# Patient Record
Sex: FEMALE
[De-identification: names, ages, dates, MRNs, and addresses within clinical notes are randomized per-mention and may not be internally consistent; named-entity substitution may affect disease eponyms.]

---

## 2019-04-01 ENCOUNTER — TRANSCRIPTION ENCOUNTER (OUTPATIENT)
Age: 19
End: 2019-04-01

## 2020-12-26 ENCOUNTER — TRANSCRIPTION ENCOUNTER (OUTPATIENT)
Age: 20
End: 2020-12-26

## 2021-01-02 ENCOUNTER — EMERGENCY (EMERGENCY)
Facility: HOSPITAL | Age: 21
LOS: 1 days | Discharge: HOME | End: 2021-01-02
Attending: EMERGENCY MEDICINE | Admitting: EMERGENCY MEDICINE
Payer: COMMERCIAL

## 2021-01-02 VITALS — HEART RATE: 98 BPM

## 2021-01-02 VITALS
DIASTOLIC BLOOD PRESSURE: 70 MMHG | HEART RATE: 118 BPM | TEMPERATURE: 99 F | OXYGEN SATURATION: 99 % | SYSTOLIC BLOOD PRESSURE: 145 MMHG | RESPIRATION RATE: 20 BRPM

## 2021-01-02 LAB
ALBUMIN SERPL ELPH-MCNC: 4.6 G/DL — SIGNIFICANT CHANGE UP (ref 3.3–5)
ALP SERPL-CCNC: 60 U/L — SIGNIFICANT CHANGE UP (ref 40–120)
ALT FLD-CCNC: 10 U/L — SIGNIFICANT CHANGE UP (ref 4–33)
ANION GAP SERPL CALC-SCNC: 15 MMOL/L — HIGH (ref 7–14)
APPEARANCE UR: ABNORMAL
APTT BLD: 34.6 SEC — SIGNIFICANT CHANGE UP (ref 27–36.3)
AST SERPL-CCNC: 18 U/L — SIGNIFICANT CHANGE UP (ref 4–32)
BASOPHILS # BLD AUTO: 0.03 K/UL — SIGNIFICANT CHANGE UP (ref 0–0.2)
BASOPHILS NFR BLD AUTO: 0.4 % — SIGNIFICANT CHANGE UP (ref 0–2)
BILIRUB SERPL-MCNC: 0.4 MG/DL — SIGNIFICANT CHANGE UP (ref 0.2–1.2)
BILIRUB UR-MCNC: NEGATIVE — SIGNIFICANT CHANGE UP
BLOOD GAS VENOUS COMPREHENSIVE RESULT: SIGNIFICANT CHANGE UP
BUN SERPL-MCNC: 8 MG/DL — SIGNIFICANT CHANGE UP (ref 7–23)
CALCIUM SERPL-MCNC: 9.6 MG/DL — SIGNIFICANT CHANGE UP (ref 8.4–10.5)
CHLORIDE SERPL-SCNC: 101 MMOL/L — SIGNIFICANT CHANGE UP (ref 98–107)
CO2 SERPL-SCNC: 22 MMOL/L — SIGNIFICANT CHANGE UP (ref 22–31)
COLOR SPEC: YELLOW — SIGNIFICANT CHANGE UP
CREAT SERPL-MCNC: 0.76 MG/DL — SIGNIFICANT CHANGE UP (ref 0.5–1.3)
DIFF PNL FLD: NEGATIVE — SIGNIFICANT CHANGE UP
EOSINOPHIL # BLD AUTO: 0.05 K/UL — SIGNIFICANT CHANGE UP (ref 0–0.5)
EOSINOPHIL NFR BLD AUTO: 0.7 % — SIGNIFICANT CHANGE UP (ref 0–6)
GLUCOSE SERPL-MCNC: 87 MG/DL — SIGNIFICANT CHANGE UP (ref 70–99)
GLUCOSE UR QL: NEGATIVE — SIGNIFICANT CHANGE UP
HCT VFR BLD CALC: 46 % — HIGH (ref 34.5–45)
HGB BLD-MCNC: 14.7 G/DL — SIGNIFICANT CHANGE UP (ref 11.5–15.5)
IANC: 4.76 K/UL — SIGNIFICANT CHANGE UP (ref 1.5–8.5)
IMM GRANULOCYTES NFR BLD AUTO: 0.3 % — SIGNIFICANT CHANGE UP (ref 0–1.5)
INR BLD: 1.22 RATIO — HIGH (ref 0.88–1.16)
KETONES UR-MCNC: ABNORMAL
LEUKOCYTE ESTERASE UR-ACNC: NEGATIVE — SIGNIFICANT CHANGE UP
LYMPHOCYTES # BLD AUTO: 1.99 K/UL — SIGNIFICANT CHANGE UP (ref 1–3.3)
LYMPHOCYTES # BLD AUTO: 26.7 % — SIGNIFICANT CHANGE UP (ref 13–44)
MCHC RBC-ENTMCNC: 31 PG — SIGNIFICANT CHANGE UP (ref 27–34)
MCHC RBC-ENTMCNC: 32 GM/DL — SIGNIFICANT CHANGE UP (ref 32–36)
MCV RBC AUTO: 97 FL — SIGNIFICANT CHANGE UP (ref 80–100)
MONOCYTES # BLD AUTO: 0.6 K/UL — SIGNIFICANT CHANGE UP (ref 0–0.9)
MONOCYTES NFR BLD AUTO: 8.1 % — SIGNIFICANT CHANGE UP (ref 2–14)
NEUTROPHILS # BLD AUTO: 4.76 K/UL — SIGNIFICANT CHANGE UP (ref 1.8–7.4)
NEUTROPHILS NFR BLD AUTO: 63.8 % — SIGNIFICANT CHANGE UP (ref 43–77)
NITRITE UR-MCNC: NEGATIVE — SIGNIFICANT CHANGE UP
NRBC # BLD: 0 /100 WBCS — SIGNIFICANT CHANGE UP
NRBC # FLD: 0 K/UL — SIGNIFICANT CHANGE UP
PH UR: 6 — SIGNIFICANT CHANGE UP (ref 5–8)
PLATELET # BLD AUTO: 335 K/UL — SIGNIFICANT CHANGE UP (ref 150–400)
POTASSIUM SERPL-MCNC: 4.1 MMOL/L — SIGNIFICANT CHANGE UP (ref 3.5–5.3)
POTASSIUM SERPL-SCNC: 4.1 MMOL/L — SIGNIFICANT CHANGE UP (ref 3.5–5.3)
PROT SERPL-MCNC: 7.9 G/DL — SIGNIFICANT CHANGE UP (ref 6–8.3)
PROT UR-MCNC: ABNORMAL
PROTHROM AB SERPL-ACNC: 13.9 SEC — HIGH (ref 10.6–13.6)
RBC # BLD: 4.74 M/UL — SIGNIFICANT CHANGE UP (ref 3.8–5.2)
RBC # FLD: 11.9 % — SIGNIFICANT CHANGE UP (ref 10.3–14.5)
SARS-COV-2 RNA SPEC QL NAA+PROBE: SIGNIFICANT CHANGE UP
SODIUM SERPL-SCNC: 138 MMOL/L — SIGNIFICANT CHANGE UP (ref 135–145)
SP GR SPEC: 1.03 — HIGH (ref 1.01–1.02)
UROBILINOGEN FLD QL: ABNORMAL
WBC # BLD: 7.45 K/UL — SIGNIFICANT CHANGE UP (ref 3.8–10.5)
WBC # FLD AUTO: 7.45 K/UL — SIGNIFICANT CHANGE UP (ref 3.8–10.5)

## 2021-01-02 PROCEDURE — 99283 EMERGENCY DEPT VISIT LOW MDM: CPT

## 2021-01-02 RX ORDER — SODIUM CHLORIDE 9 MG/ML
1000 INJECTION INTRAMUSCULAR; INTRAVENOUS; SUBCUTANEOUS ONCE
Refills: 0 | Status: COMPLETED | OUTPATIENT
Start: 2021-01-02 | End: 2021-01-02

## 2021-01-02 RX ADMIN — SODIUM CHLORIDE 1000 MILLILITER(S): 9 INJECTION INTRAMUSCULAR; INTRAVENOUS; SUBCUTANEOUS at 10:54

## 2021-01-02 NOTE — ED PROVIDER NOTE - PATIENT PORTAL LINK FT
You can access the FollowMyHealth Patient Portal offered by [facility] by registering at the following website: [email or web address]. By joining [employer]’s FollowMyHealth portal, you will also be able to view your health information using other applications (apps) compatible with our system.

## 2021-01-02 NOTE — ED PROVIDER NOTE - ATTENDING CONTRIBUTION TO CARE
Dr. Chauhan:  I have personally performed a face to face bedside history and physical examination of this patient. I have discussed the history, examination, review of systems, assessment and plan of management with the resident. I have reviewed the electronic medical record and amended it to reflect my history, review of systems, physical exam, assessment and plan.    20F denies pmh presents with 3-4 days of diarrhea with streaks of blood mixed in, approximately 4 episodes a day.  Intermittently with mild abdominal cramping.  Denies sick contacts, fever/chills, cp ,sob, n/v, urinary symptoms.  Denies family h/o IBD.    Exam:  - nad  - rrr   -ctab   -abd soft ntnd    A/P  - blood streaked stools, likely infectious component; consider IBD, less likely malignancy given age  - cbc, cmp, coags, ucg  - f/u GI outpatient Dr. Chauhan:  I have personally performed a face to face bedside history and physical examination of this patient. I have discussed the history, examination, review of systems, assessment and plan of management with the resident. I have reviewed the electronic medical record and amended it to reflect my history, review of systems, physical exam, assessment and plan.    20F denies pmh presents with 3-4 days of diarrhea with streaks of blood mixed in, approximately 4 episodes a day.  Intermittently with mild abdominal cramping.  Denies sick contacts, fever/chills, cp ,sob, n/v, urinary symptoms.  Denies family h/o IBD.    Exam:  - nad  - rrr   -ctab   -abd soft ntnd    A/P  - blood streaked stools, likely infectious component; consider IBD, less likely malignancy given age  - cbc, cmp, coags, ucg, covid swab  - f/u GI outpatient I performed the initial face to face bedside interview with this patient regarding history of present illness, review of symptoms and past medical, social and family history.  I completed an independent physical examination.  I was the initial provider who evaluated this patient.  The history, review of symptoms and examination was documented by me.  I have signed out the follow up of any pending tests (i.e. labs, radiological studies) to the resident.  I have discussed the patient’s plan of care and disposition with the resident.

## 2021-01-02 NOTE — ED PROVIDER NOTE - PROGRESS NOTE DETAILS
Jose Durham PGY1: Pt was re-evaluated at bedside, VSS, feeling better overall. Results were discussed with patient as well as return precautions and follow up plan with PCP and/or specialist. Time was taken to answer any questions that the patient had before providing them with discharge paperwork.

## 2021-01-02 NOTE — ED PROVIDER NOTE - NSFOLLOWUPCLINICS_GEN_ALL_ED_FT
[facility]  Gastroenterology  [address] [place] [id number]  Phone: [phone]  Fax:   Follow Up Time:

## 2021-01-02 NOTE — ED ADULT TRIAGE NOTE - CHIEF COMPLAINT QUOTE
Pt c/o diarrhea with abdominal cramping and blood in her stool x4 days--Pt states she sees blood when she goes

## 2021-01-02 NOTE — ED PROVIDER NOTE - NSFOLLOWUPINSTRUCTIONS_ED_ALL_ED_FT
Diarrhea    Diarrhea is frequent loose or watery bowel movements that has many causes. Diarrhea can make you feel weak and cause you to become dehydrated. Diarrhea typically lasts 2–3 days, but can last longer if it is a sign of something more serious. Drink clear fluids to prevent dehydration. Eat bland, easy-to-digest foods as tolerated.     SEEK IMMEDIATE MEDICAL CARE IF YOU HAVE ANY OF THE FOLLOWING SYMPTOMS: high fevers, lightheadedness/dizziness, chest pain, shortness of breath, severe abdominal or back pain, or any signs of dehydration.    Please make an appointment to follow up with a Gastroenterologist within 2-3 days.

## 2021-01-02 NOTE — ED ADULT NURSE NOTE - NSIMPLEMENTINTERV_GEN_ALL_ED
Implemented All Universal Safety Interventions:  [place] to call system. Call bell, personal items and telephone within reach. Instruct patient to call for assistance. Room bathroom lighting operational. Non-slip footwear when patient is off stretcher. Physically safe environment: no spills, clutter or unnecessary equipment. Stretcher in lowest position, wheels locked, appropriate side rails in place.

## 2021-01-02 NOTE — ED PROVIDER NOTE - CLINICAL SUMMARY MEDICAL DECISION MAKING FREE TEXT BOX
- blood streaked stools, likely infectious component; consider IBD, less likely malignancy given age  - cbc, cmp, coags, ucg, covid swab  - f/u GI outpatient - blood streaked stools, likely infectious component; consider IBD, less likely malignancy given age  - cbc, cmp, coags, ucg, covid swab  - f/u GI outpatient, given referral list

## 2021-01-02 NOTE — ED PROVIDER NOTE - OBJECTIVE STATEMENT
20F denies pmh presents with 3-4 days of diarrhea with streaks of blood mixed in, approximately 4 episodes a day.  Intermittently with mild abdominal cramping.  Denies sick contacts, fever/chills, cp ,sob, n/v, urinary symptoms.  Denies family h/o IBD.

## 2021-01-04 ENCOUNTER — APPOINTMENT (OUTPATIENT)
Dept: GASTROENTEROLOGY | Facility: CLINIC | Age: 21
End: 2021-01-04
Payer: COMMERCIAL

## 2021-01-04 ENCOUNTER — NON-APPOINTMENT (OUTPATIENT)
Age: 21
End: 2021-01-04

## 2021-01-04 VITALS
HEART RATE: 116 BPM | TEMPERATURE: 96.9 F | BODY MASS INDEX: 19.81 KG/M2 | WEIGHT: 116 LBS | SYSTOLIC BLOOD PRESSURE: 127 MMHG | HEIGHT: 64 IN | DIASTOLIC BLOOD PRESSURE: 90 MMHG

## 2021-01-04 DIAGNOSIS — Z80.3 FAMILY HISTORY OF MALIGNANT NEOPLASM OF BREAST: ICD-10-CM

## 2021-01-04 DIAGNOSIS — Z80.0 FAMILY HISTORY OF MALIGNANT NEOPLASM OF DIGESTIVE ORGANS: ICD-10-CM

## 2021-01-04 DIAGNOSIS — R19.5 OTHER FECAL ABNORMALITIES: ICD-10-CM

## 2021-01-04 PROBLEM — Z00.00 ENCOUNTER FOR PREVENTIVE HEALTH EXAMINATION: Status: ACTIVE | Noted: 2021-01-04

## 2021-01-04 LAB
CULTURE RESULTS: SIGNIFICANT CHANGE UP
SPECIMEN SOURCE: SIGNIFICANT CHANGE UP

## 2021-01-04 PROCEDURE — 82272 OCCULT BLD FECES 1-3 TESTS: CPT

## 2021-01-04 PROCEDURE — 99204 OFFICE O/P NEW MOD 45 MIN: CPT

## 2021-01-04 PROCEDURE — 99072 ADDL SUPL MATRL&STAF TM PHE: CPT

## 2021-01-04 NOTE — HISTORY OF PRESENT ILLNESS
[FreeTextEntry1] : Sara was in her usual state of good health until approximately 4-5 days ago, when she noted diffuse abdominal cramps and bloody diarrhea.  She also noted sense of incomplete evacuation with borborygmi, burping, and flatulence.  Symptoms have been awakening her from a sound sleep.  She took Pepto-Bismol, without improvement.  Because of the tenesmus, she tried a laxative for two days, without improvement.  She denied fever, nausea, ingestion of bad food, others similarly affected, recent antibiotic use, or recent travel.  She presented to [facility] on 1/2/21, where labs were unremarkable, and she was discharged, to be seen by GI.  Her grandmother had liver cancer.

## 2021-01-04 NOTE — ASSESSMENT
[FreeTextEntry1] : 1.  Recent crampy pain with bloody diarrhea, tenesmus, gassiness--rule out acute, self-limited colitis (such as infection) or initial presentation of inflammatory bowel disease.\par 2.  Allergic to amoxicillin, peanuts.\par \par Plan:\par 1.  Records from [facility] ER reviewed.\par 2.  Submit stool specimen to lab for GI PCR.\par 3.  Dietary instruction given.\par 4.  If stool negative for pathogen, proceed with diagnostic colonoscopy--Procedure, rationale, anesthesia plan, and MiraLax prep instructions were reviewed and brochure given.\par 5.  Might also consider cross-sectional imaging if stools negative.\par 6.  Low-dose Imodium after stool submitted to the lab.

## 2021-01-04 NOTE — PHYSICAL EXAM
[General Appearance - Alert] : alert [General Appearance - In No Acute Distress] : in no acute distress [General Appearance - Well Nourished] : well nourished [General Appearance - Well Developed] : well developed [Sclera] : the sclera and conjunctiva were normal [Neck Appearance] : the appearance of the neck was normal [Neck Cervical Mass (___cm)] : no neck mass was observed [Jugular Venous Distention Increased] : there was no jugular-venous distention [Thyroid Diffuse Enlargement] : the thyroid was not enlarged [Thyroid Nodule] : there were no palpable thyroid nodules [Auscultation Breath Sounds / Voice Sounds] : lungs were clear to auscultation bilaterally [Heart Rate And Rhythm] : heart rate was normal and rhythm regular [Heart Sounds] : normal S1 and S2 [Heart Sounds Gallop] : no gallops [Murmurs] : no murmurs [Heart Sounds Pericardial Friction Rub] : no pericardial rub [Full Pulse] : the pedal pulses are present [Edema] : there was no peripheral edema [Abdomen Soft] : soft [Abdomen Tenderness] : non-tender [Abdomen Mass (___ Cm)] : no abdominal mass palpated [Abdomen Hernia] : no hernia was discovered [Hyperactive] : hyperactive [Normal Sphincter Tone] : normal sphincter tone [No Rectal Mass] : no rectal mass [Occult Blood Positive] : stool positive for occult blood [Cervical Lymph Nodes Enlarged Posterior Bilaterally] : posterior cervical [Cervical Lymph Nodes Enlarged Anterior Bilaterally] : anterior cervical [Supraclavicular Lymph Nodes Enlarged Bilaterally] : supraclavicular [Inguinal Lymph Nodes Enlarged Bilaterally] : inguinal [Abnormal Walk] : normal gait [Nail Clubbing] : no clubbing  or cyanosis of the fingernails [Musculoskeletal - Swelling] : no joint swelling seen [Motor Tone] : muscle strength and tone were normal [Skin Color & Pigmentation] : normal skin color and pigmentation [Skin Turgor] : normal skin turgor [] : no rash [Oriented To Time, Place, And Person] : oriented to person, place, and time [Impaired Insight] : insight and judgment were intact [Internal Hemorrhoid] : no internal hemorrhoids [External Hemorrhoid] : no external hemorrhoids [FreeTextEntry1] : a bit tearful

## 2021-01-04 NOTE — REVIEW OF SYSTEMS
[Abdominal Pain] : abdominal pain [Diarrhea] : diarrhea [Melena] : melbay [Negative] : Heme/Lymph [As Noted in HPI] : as noted in HPI

## 2021-01-05 ENCOUNTER — LABORATORY RESULT (OUTPATIENT)
Age: 21
End: 2021-01-05

## 2021-01-06 ENCOUNTER — EMERGENCY (EMERGENCY)
Facility: HOSPITAL | Age: 21
LOS: 1 days | Discharge: HOME | End: 2021-01-06
Attending: EMERGENCY MEDICINE
Payer: COMMERCIAL

## 2021-01-06 VITALS
DIASTOLIC BLOOD PRESSURE: 90 MMHG | HEART RATE: 99 BPM | SYSTOLIC BLOOD PRESSURE: 126 MMHG | TEMPERATURE: 98 F | RESPIRATION RATE: 17 BRPM | OXYGEN SATURATION: 99 %

## 2021-01-06 VITALS
HEIGHT: 63 IN | RESPIRATION RATE: 18 BRPM | OXYGEN SATURATION: 100 % | WEIGHT: 119.93 LBS | HEART RATE: 119 BPM | TEMPERATURE: 98 F | SYSTOLIC BLOOD PRESSURE: 134 MMHG | DIASTOLIC BLOOD PRESSURE: 97 MMHG

## 2021-01-06 LAB
ALBUMIN SERPL ELPH-MCNC: 4.5 G/DL — SIGNIFICANT CHANGE UP (ref 3.3–5)
ALP SERPL-CCNC: 60 U/L — SIGNIFICANT CHANGE UP (ref 40–120)
ALT FLD-CCNC: 7 U/L — LOW (ref 10–45)
ANION GAP SERPL CALC-SCNC: 15 MMOL/L — SIGNIFICANT CHANGE UP (ref 5–17)
AST SERPL-CCNC: 16 U/L — SIGNIFICANT CHANGE UP (ref 10–40)
BASE EXCESS BLDV CALC-SCNC: -1.5 MMOL/L — SIGNIFICANT CHANGE UP (ref -2–2)
BASOPHILS # BLD AUTO: 0.02 K/UL — SIGNIFICANT CHANGE UP (ref 0–0.2)
BASOPHILS NFR BLD AUTO: 0.2 % — SIGNIFICANT CHANGE UP (ref 0–2)
BILIRUB SERPL-MCNC: 0.2 MG/DL — SIGNIFICANT CHANGE UP (ref 0.2–1.2)
BUN SERPL-MCNC: 5 MG/DL — LOW (ref 7–23)
CA-I SERPL-SCNC: 1.12 MMOL/L — SIGNIFICANT CHANGE UP (ref 1.12–1.3)
CALCIUM SERPL-MCNC: 9 MG/DL — SIGNIFICANT CHANGE UP (ref 8.4–10.5)
CHLORIDE BLDV-SCNC: 107 MMOL/L — SIGNIFICANT CHANGE UP (ref 96–108)
CHLORIDE SERPL-SCNC: 100 MMOL/L — SIGNIFICANT CHANGE UP (ref 96–108)
CO2 BLDV-SCNC: 27 MMOL/L — SIGNIFICANT CHANGE UP (ref 22–30)
CO2 SERPL-SCNC: 22 MMOL/L — SIGNIFICANT CHANGE UP (ref 22–31)
CREAT SERPL-MCNC: 0.73 MG/DL — SIGNIFICANT CHANGE UP (ref 0.5–1.3)
EOSINOPHIL # BLD AUTO: 0.07 K/UL — SIGNIFICANT CHANGE UP (ref 0–0.5)
EOSINOPHIL NFR BLD AUTO: 0.8 % — SIGNIFICANT CHANGE UP (ref 0–6)
GAS PNL BLDV: 129 MMOL/L — LOW (ref 135–145)
GAS PNL BLDV: SIGNIFICANT CHANGE UP
GAS PNL BLDV: SIGNIFICANT CHANGE UP
GLUCOSE BLDV-MCNC: 89 MG/DL — SIGNIFICANT CHANGE UP (ref 70–99)
GLUCOSE SERPL-MCNC: 89 MG/DL — SIGNIFICANT CHANGE UP (ref 70–99)
HCO3 BLDV-SCNC: 25 MMOL/L — SIGNIFICANT CHANGE UP (ref 21–29)
HCT VFR BLD CALC: 40.5 % — SIGNIFICANT CHANGE UP (ref 34.5–45)
HCT VFR BLDA CALC: 46 % — SIGNIFICANT CHANGE UP (ref 39–50)
HGB BLD CALC-MCNC: 15 G/DL — SIGNIFICANT CHANGE UP (ref 11.5–15.5)
HGB BLD-MCNC: 13.5 G/DL — SIGNIFICANT CHANGE UP (ref 11.5–15.5)
IMM GRANULOCYTES NFR BLD AUTO: 0.3 % — SIGNIFICANT CHANGE UP (ref 0–1.5)
LACTATE BLDV-MCNC: 1.6 MMOL/L — SIGNIFICANT CHANGE UP (ref 0.7–2)
LACTATE BLDV-MCNC: 3.1 MMOL/L — HIGH (ref 0.7–2)
LIDOCAIN IGE QN: 18 U/L — SIGNIFICANT CHANGE UP (ref 7–60)
LYMPHOCYTES # BLD AUTO: 4.39 K/UL — HIGH (ref 1–3.3)
LYMPHOCYTES # BLD AUTO: 48.5 % — HIGH (ref 13–44)
MAGNESIUM SERPL-MCNC: 1.9 MG/DL — SIGNIFICANT CHANGE UP (ref 1.6–2.6)
MCHC RBC-ENTMCNC: 31.9 PG — SIGNIFICANT CHANGE UP (ref 27–34)
MCHC RBC-ENTMCNC: 33.3 GM/DL — SIGNIFICANT CHANGE UP (ref 32–36)
MCV RBC AUTO: 95.7 FL — SIGNIFICANT CHANGE UP (ref 80–100)
MONOCYTES # BLD AUTO: 1 K/UL — HIGH (ref 0–0.9)
MONOCYTES NFR BLD AUTO: 11 % — SIGNIFICANT CHANGE UP (ref 2–14)
NEUTROPHILS # BLD AUTO: 3.54 K/UL — SIGNIFICANT CHANGE UP (ref 1.8–7.4)
NEUTROPHILS NFR BLD AUTO: 39.2 % — LOW (ref 43–77)
NRBC # BLD: 0 /100 WBCS — SIGNIFICANT CHANGE UP (ref 0–0)
PCO2 BLDV: 54 MMHG — HIGH (ref 35–50)
PH BLDV: 7.3 — LOW (ref 7.35–7.45)
PLATELET # BLD AUTO: 333 K/UL — SIGNIFICANT CHANGE UP (ref 150–400)
PO2 BLDV: 25 MMHG — SIGNIFICANT CHANGE UP (ref 25–45)
POTASSIUM BLDV-SCNC: 3.4 MMOL/L — LOW (ref 3.5–5.3)
POTASSIUM SERPL-MCNC: 3.4 MMOL/L — LOW (ref 3.5–5.3)
POTASSIUM SERPL-SCNC: 3.4 MMOL/L — LOW (ref 3.5–5.3)
PROT SERPL-MCNC: 8.1 G/DL — SIGNIFICANT CHANGE UP (ref 6–8.3)
RBC # BLD: 4.23 M/UL — SIGNIFICANT CHANGE UP (ref 3.8–5.2)
RBC # FLD: 11.9 % — SIGNIFICANT CHANGE UP (ref 10.3–14.5)
SAO2 % BLDV: 38 % — LOW (ref 67–88)
SODIUM SERPL-SCNC: 137 MMOL/L — SIGNIFICANT CHANGE UP (ref 135–145)
WBC # BLD: 9.05 K/UL — SIGNIFICANT CHANGE UP (ref 3.8–10.5)
WBC # FLD AUTO: 9.05 K/UL — SIGNIFICANT CHANGE UP (ref 3.8–10.5)

## 2021-01-06 PROCEDURE — 84295 ASSAY OF SERUM SODIUM: CPT

## 2021-01-06 PROCEDURE — 83605 ASSAY OF LACTIC ACID: CPT

## 2021-01-06 PROCEDURE — 85018 HEMOGLOBIN: CPT

## 2021-01-06 PROCEDURE — 83690 ASSAY OF LIPASE: CPT

## 2021-01-06 PROCEDURE — 80053 COMPREHEN METABOLIC PANEL: CPT

## 2021-01-06 PROCEDURE — 82330 ASSAY OF CALCIUM: CPT

## 2021-01-06 PROCEDURE — 99284 EMERGENCY DEPT VISIT MOD MDM: CPT

## 2021-01-06 PROCEDURE — 85014 HEMATOCRIT: CPT

## 2021-01-06 PROCEDURE — 82947 ASSAY GLUCOSE BLOOD QUANT: CPT

## 2021-01-06 PROCEDURE — 83735 ASSAY OF MAGNESIUM: CPT

## 2021-01-06 PROCEDURE — 36415 COLL VENOUS BLD VENIPUNCTURE: CPT

## 2021-01-06 PROCEDURE — 82803 BLOOD GASES ANY COMBINATION: CPT

## 2021-01-06 PROCEDURE — 84132 ASSAY OF SERUM POTASSIUM: CPT

## 2021-01-06 PROCEDURE — 85025 COMPLETE CBC W/AUTO DIFF WBC: CPT

## 2021-01-06 PROCEDURE — 99283 EMERGENCY DEPT VISIT LOW MDM: CPT

## 2021-01-06 PROCEDURE — 82435 ASSAY OF BLOOD CHLORIDE: CPT

## 2021-01-06 RX ORDER — SODIUM CHLORIDE 9 MG/ML
1000 INJECTION, SOLUTION INTRAVENOUS ONCE
Refills: 0 | Status: COMPLETED | OUTPATIENT
Start: 2021-01-06 | End: 2021-01-06

## 2021-01-06 RX ADMIN — SODIUM CHLORIDE 1000 MILLILITER(S): 9 INJECTION, SOLUTION INTRAVENOUS at 22:28

## 2021-01-06 NOTE — ED PROVIDER NOTE - RAPID ASSESSMENT
20 y.o F with PMHx of asthma presents with 4-5 days of generalized diffuse abd pain and bloating associated with 1-2 episodes of bloody diarrhea described as bright red blood. Pain is intermittent in nature. Currently does not endorse pain. Denies cough, fever, chills, SOB, myalgias, HA, blood thinning medications, or hx of GI disorders. LMP: several weeks ago.     Scribe Statement: I, Amy Coy, attest that this documentation has been prepared under the direction and in the presence of Aayush Kumar)   Aayush Kumar) Patient was rapidly assessed via a telemedicine and/or role of Quick Triage Doctor; a limited history, physical exam and assessment was performed. The patient will be seen and further evaluated in the main emergency department. The remainder of care and evaluation will be conducted by the primary emergency medicine team. Receiving team will follow up on labs, imaging and serially reassess patient as indicated. All further decisions regarding patient care, evaluation and disposition are at the discretion of the receiving primary emergency department team.   Aayush Kumar) I personally performed the service described in the documentation recorded by the scribe in my presence, and it accurately and completely records my words and actions.

## 2021-01-06 NOTE — ED PROVIDER NOTE - OBJECTIVE STATEMENT
Cayden Jules (PA): 20y F with PMHx of Asthma presents to the ED c/o generalized abdominal pain with bloating and several episodes of blood streaked stools for the past week. Pt saw GI recently for her symptoms, had negative stool culture, and was subsequently scheduled for a colonoscopy. Denies prior episodes of similar symptoms in the past. Denies eating raw or undercooked foods or sick contact. Denies prior hx of IBD. No FHx of colon CA. Tolerating PO intake, no N/V, F/C, CP or SOB. Denies urinary symptoms or back pain.    Pt unsure when her LMP is, but it was several weeks ago. Cayden Jules (PA): 20y F with PMHx of Asthma presents to the ED c/o generalized abdominal pain with bloating and several episodes of blood streaked stools for the past week. Pt saw GI recently for her symptoms, had negative stool culture, and was subsequently scheduled for a colonoscopy. Denies prior episodes of similar symptoms in the past. Denies eating raw or undercooked foods or sick contacts. Nobody else at home has similar symptoms.Denies prior hx of IBD. No FHx of colon CA. Tolerating PO intake, no N/V, F/C, CP or SOB. Denies urinary symptoms or back pain.    Pt unsure when her LMP is, but it was several weeks ago. Cayden Jules (PA): 20y F with PMHx of Asthma presents to the ED c/o generalized abdominal pain with bloating and several episodes of blood streaked stools for the past week. Pt saw GI recently for her symptoms, had negative stool culture, and was subsequently scheduled for a colonoscopy. Denies prior episodes of similar symptoms in the past. Denies eating raw or undercooked foods or sick contacts. Nobody else at home has similar symptoms. Denies prior hx of IBD. No FHx of colon CA. Tolerating PO intake, no N/V, F/C, CP or SOB. Denies urinary symptoms or back pain.    Pt unsure when her LMP is, but it was several weeks ago.

## 2021-01-06 NOTE — ED PROVIDER NOTE - PROGRESS NOTE DETAILS
all labs reviewed with pt. pt reports some improvement. pt counseled on supportive care measures and outpatient followup with GI. Will dc with follow up. Discussed plan and return precautions with patient who understands and agrees. All questions answered. - Cayden Jules PA-C all labs reviewed with pt. pt reports some improvement. tolerating po. pt counseled on supportive care measures and outpatient followup with GI. Will dc with follow up. Discussed plan and return precautions with patient who understands and agrees. All questions answered. - Cayden Jules PA-C

## 2021-01-06 NOTE — ED ADULT NURSE NOTE - OBJECTIVE STATEMENT
pt here for abd pain and blloody diarrhea for 4 days.  she has nausea qas well  appetite is diminished  abd is tender tot ouch and is generalized

## 2021-01-06 NOTE — ED PROVIDER NOTE - GASTROINTESTINAL, MLM
Abdomen soft, non-tender, no guarding. Abdomen soft, non-tender, no guarding. NEG murphys, NEG mcburney's point tenderness, NEG rovsings/obturator/psoas

## 2021-01-06 NOTE — ED PROVIDER NOTE - NSFOLLOWUPINSTRUCTIONS_ED_ALL_ED_FT
Please follow up with your primary care doctor within 1 week.  Follow up with your gastroenterologist within 1 week.  *Bring all printed lab/test results to your appointment(s).*    Stay well hydrated with water and electrolyte replacement solutions such as Pedialyte or the adult equivalent.     You may take over the counter medicines such as loperamide to prevent diarrhea    Return to the ED for worsening pain, inability to tolerate food/drink, dizziness, loss of consciousness, or any other concerns. Please follow up with your primary care doctor within 1 week.    Follow up with your gastroenterologist within 1 week:  [facility]  Gastroenterology  [address], [address]  [place] [address]  Phone: [phone]    *Bring all printed lab/test results to your appointment(s).*    Stay well hydrated with water and electrolyte replacement solutions such as Pedialyte or the adult equivalent.     You may take over the counter medicines such as loperamide to prevent diarrhea    Return to the ED for worsening pain, inability to tolerate food/drink, dizziness, loss of consciousness, or any other concerns.

## 2021-01-06 NOTE — ED PROVIDER NOTE - PATIENT PORTAL LINK FT
You can access the FollowMyHealth Patient Portal offered by [facility] by registering at the following website: [email or web address]. By joining [email or web address]’s FollowMyHealth portal, you will also be able to view your health information using other applications (apps) compatible with our system.

## 2021-01-06 NOTE — ED PROVIDER NOTE - CLINICAL SUMMARY MEDICAL DECISION MAKING FREE TEXT BOX
Cayden Jules (PA): 20y F presenting with 1 week of generalized abdominal pain with bloating and several episodes of blood streaked stools daily. Well appearing, abdomen soft and nontender. DDx includes infectious gastroenteritis vs. IBD flare vs. colitis. [facility] ordered basic labs with HCG and UA, will do as above. No emergent indication for imaging as abdomen is completely nontender at time of evaluation. + Outpt GI follow up. Will follow up results and dispo accordingly. Cayden Jules (PA): 20y F presenting with 1 week of generalized abdominal pain with bloating and several episodes of blood streaked stools daily. Well appearing, abdomen soft and nontender. DDx includes infectious gastroenteritis vs. IBD flare vs. colitis. [facility] ordered basic labs with HCG and UA - will do as above and give outpt GI followup. No emergent indication for imaging as abdomen is completely nontender at time of evaluation. Will follow up results and dispo accordingly. Cayden Jules (PA): 20y F presenting with 1 week of generalized abdominal pain with bloating and several episodes of blood streaked stools daily. Well appearing, abdomen soft and nontender. DDx includes infectious gastroenteritis vs. IBD flare vs. colitis. [facility] ordered basic labs with HCG and UA - will do as above and give outpt GI followup. No emergent indication for imaging as abdomen is completely nontender at time of evaluation. Will follow up results and dispo accordingly.    Lois Peña MD - Attending Physician: Pt here with abd pain, blood-streak diarrhea. Able to tolerate po, no vomiting. No fever. Well appearing, exam nonfocal, no clinical dehydration. Likely colitis, less likely IBD. Stool culture neg yesterday. Has GI follow-up. Symptomatic control, f/u labs sent from oc

## 2021-01-07 DIAGNOSIS — Z01.818 ENCOUNTER FOR OTHER PREPROCEDURAL EXAMINATION: ICD-10-CM

## 2021-01-08 ENCOUNTER — APPOINTMENT (OUTPATIENT)
Dept: DISASTER EMERGENCY | Facility: CLINIC | Age: 21
End: 2021-01-08

## 2021-01-10 LAB — SARS-COV-2 N GENE NPH QL NAA+PROBE: NOT DETECTED

## 2021-01-12 ENCOUNTER — LABORATORY RESULT (OUTPATIENT)
Age: 21
End: 2021-01-12

## 2021-01-13 ENCOUNTER — APPOINTMENT (OUTPATIENT)
Dept: GASTROENTEROLOGY | Facility: CLINIC | Age: 21
End: 2021-01-13
Payer: COMMERCIAL

## 2021-01-13 ENCOUNTER — LABORATORY RESULT (OUTPATIENT)
Age: 21
End: 2021-01-13

## 2021-01-13 PROBLEM — J45.909 UNSPECIFIED ASTHMA, UNCOMPLICATED: Chronic | Status: ACTIVE | Noted: 2021-01-06

## 2021-01-13 PROCEDURE — 84703 CHORIONIC GONADOTROPIN ASSAY: CPT | Mod: 59,QW

## 2021-01-13 PROCEDURE — 45380 COLONOSCOPY AND BIOPSY: CPT

## 2021-01-13 PROCEDURE — 99072 ADDL SUPL MATRL&STAF TM PHE: CPT

## 2021-01-22 ENCOUNTER — APPOINTMENT (OUTPATIENT)
Dept: GASTROENTEROLOGY | Facility: CLINIC | Age: 21
End: 2021-01-22
Payer: COMMERCIAL

## 2021-01-22 ENCOUNTER — LABORATORY RESULT (OUTPATIENT)
Age: 21
End: 2021-01-22

## 2021-01-22 VITALS
DIASTOLIC BLOOD PRESSURE: 86 MMHG | TEMPERATURE: 97.6 F | HEART RATE: 112 BPM | HEIGHT: 64 IN | WEIGHT: 120 LBS | SYSTOLIC BLOOD PRESSURE: 124 MMHG | BODY MASS INDEX: 20.49 KG/M2

## 2021-01-22 PROCEDURE — 99072 ADDL SUPL MATRL&STAF TM PHE: CPT

## 2021-01-22 PROCEDURE — 36415 COLL VENOUS BLD VENIPUNCTURE: CPT

## 2021-01-22 PROCEDURE — 99214 OFFICE O/P EST MOD 30 MIN: CPT | Mod: 25

## 2021-01-22 NOTE — REASON FOR VISIT
[Follow-Up: _____] : a [unfilled] follow-up visit [Other: _____] : [unfilled] [FreeTextEntry1] : Biopsy results; colitis

## 2021-01-22 NOTE — ASSESSMENT
[FreeTextEntry1] : 1.  Recent abdominal crampy pain with bloody diarrhea, tenesmus, gassiness, with stools negative for pathogens; pancolitis (with microscopic ileal involvement as well) at colonoscopy 1/13/2021--possible initial presentation of IBD, particularly given the chronic changes noted on biopsies.  It is possible that she had an acute, self-limited infection or reaction that has subsided.  She could have had a terrific response to oral mesalamine.\par 2.  Allergic to amoxicillin, peanuts.\par \par Plan:\par 1.  Bloodwork drawn by me this AM.\par 2.  CT enterography (with oral & IV contrast).\par 3.  Continue Apriso 0.375 gm 4 caps daily with food for now.\par 4.  Liberalize diet. \par 5.  Lengthy discussion regarding the probability of IBD (diagnosis of Crohn's may be favored because of chronic  ileal involvement) ensued; patient and mother aware that this could be the initial presentation of a chronic disease, whether ulcerative colitis or Crohn's unclear at this time.\par 6.  Pending lab & CT results, and clinical course, other recommendations to follow. \par 7.  Reiterated need for an internist!

## 2021-01-24 ENCOUNTER — NON-APPOINTMENT (OUTPATIENT)
Age: 21
End: 2021-01-24

## 2021-01-25 LAB
ALBUMIN SERPL ELPH-MCNC: 4.3 G/DL
ALP BLD-CCNC: 60 U/L
ALT SERPL-CCNC: 15 U/L
ANION GAP SERPL CALC-SCNC: 14 MMOL/L
AST SERPL-CCNC: 19 U/L
BASOPHILS # BLD AUTO: 0.05 K/UL
BASOPHILS NFR BLD AUTO: 0.6 %
BILIRUB DIRECT SERPL-MCNC: 0.1 MG/DL
BILIRUB SERPL-MCNC: 0.3 MG/DL
BUN SERPL-MCNC: 8 MG/DL
CALCIUM SERPL-MCNC: 9.6 MG/DL
CHLORIDE SERPL-SCNC: 102 MMOL/L
CO2 SERPL-SCNC: 22 MMOL/L
CREAT SERPL-MCNC: 0.79 MG/DL
CRP SERPL-MCNC: 0.11 MG/DL
ENDOMYSIUM IGA SER QL: NEGATIVE
ENDOMYSIUM IGA TITR SER: NORMAL
EOSINOPHIL # BLD AUTO: 0.19 K/UL
EOSINOPHIL NFR BLD AUTO: 2.3 %
ERYTHROCYTE [SEDIMENTATION RATE] IN BLOOD BY WESTERGREN METHOD: 20 MM/HR
ESTIMATED AVERAGE GLUCOSE: 100 MG/DL
FERRITIN SERPL-MCNC: 18 NG/ML
GGT SERPL-CCNC: 15 U/L
GLUCOSE SERPL-MCNC: 82 MG/DL
HBA1C MFR BLD HPLC: 5.1 %
HCT VFR BLD CALC: 38 %
HGB BLD-MCNC: 12.2 G/DL
IGA SER QL IEP: 353 MG/DL
IMM GRANULOCYTES NFR BLD AUTO: 0.2 %
IRON SATN MFR SERPL: 339 %
IRON SERPL-MCNC: 49 UG/DL
LYMPHOCYTES # BLD AUTO: 3.58 K/UL
LYMPHOCYTES NFR BLD AUTO: 42.6 %
MAGNESIUM SERPL-MCNC: 2 MG/DL
MAN DIFF?: NORMAL
MCHC RBC-ENTMCNC: 31.9 PG
MCHC RBC-ENTMCNC: 32.1 GM/DL
MCV RBC AUTO: 99.2 FL
MONOCYTES # BLD AUTO: 0.48 K/UL
MONOCYTES NFR BLD AUTO: 5.7 %
MPO AB + PR3 PNL SER: NORMAL
NEUTROPHILS # BLD AUTO: 4.09 K/UL
NEUTROPHILS NFR BLD AUTO: 48.6 %
PHOSPHATE SERPL-MCNC: 3.5 MG/DL
PLATELET # BLD AUTO: 460 K/UL
POTASSIUM SERPL-SCNC: 4 MMOL/L
PROT SERPL-MCNC: 7.5 G/DL
RBC # BLD: 3.83 M/UL
RBC # FLD: 13.1 %
SODIUM SERPL-SCNC: 138 MMOL/L
T3 SERPL-MCNC: 146 NG/DL
T3RU NFR SERPL: 1 TBI
T4 FREE SERPL-MCNC: 1.1 NG/DL
T4 SERPL-MCNC: 8.3 UG/DL
TIBC SERPL-MCNC: 14 UG/DL
TSH SERPL-ACNC: 2.48 UIU/ML
UIBC SERPL-MCNC: 290 UG/DL
VIT B12 SERPL-MCNC: >2000 PG/ML
WBC # FLD AUTO: 8.41 K/UL

## 2021-01-26 LAB
GLIADIN IGA SER QL: 5.1 UNITS
GLIADIN IGG SER QL: 5 UNITS
GLIADIN PEPTIDE IGA SER-ACNC: NEGATIVE
GLIADIN PEPTIDE IGG SER-ACNC: NEGATIVE

## 2021-01-28 LAB
TTG IGA SER IA-ACNC: 7 U/ML
TTG IGA SER-ACNC: ABNORMAL
TTG IGG SER IA-ACNC: 8.7 U/ML
TTG IGG SER IA-ACNC: ABNORMAL

## 2021-01-30 LAB
BAKER'S YEAST AB QL: 24.8 UNITS
BAKER'S YEAST IGA QL IA: 17.8 UNITS
BAKER'S YEAST IGA QN IA: NEGATIVE
BAKER'S YEAST IGG QN IA: ABNORMAL

## 2021-02-04 ENCOUNTER — APPOINTMENT (OUTPATIENT)
Dept: CT IMAGING | Facility: IMAGING CENTER | Age: 21
End: 2021-02-04

## 2021-02-04 ENCOUNTER — RESULT REVIEW (OUTPATIENT)
Age: 21
End: 2021-02-04

## 2021-02-04 ENCOUNTER — OUTPATIENT (OUTPATIENT)
Dept: OUTPATIENT SERVICES | Facility: HOSPITAL | Age: 21
LOS: 1 days | End: 2021-02-04
Payer: COMMERCIAL

## 2021-02-04 DIAGNOSIS — R19.7 DIARRHEA, UNSPECIFIED: ICD-10-CM

## 2021-02-04 DIAGNOSIS — K52.9 NONINFECTIVE GASTROENTERITIS AND COLITIS, UNSPECIFIED: ICD-10-CM

## 2021-02-04 PROCEDURE — 74177 CT ABD & PELVIS W/CONTRAST: CPT

## 2021-02-04 PROCEDURE — 74177 CT ABD & PELVIS W/CONTRAST: CPT | Mod: 26

## 2021-02-08 ENCOUNTER — NON-APPOINTMENT (OUTPATIENT)
Age: 21
End: 2021-02-08

## 2021-02-19 ENCOUNTER — APPOINTMENT (OUTPATIENT)
Dept: GASTROENTEROLOGY | Facility: CLINIC | Age: 21
End: 2021-02-19
Payer: COMMERCIAL

## 2021-02-19 VITALS
HEIGHT: 64 IN | BODY MASS INDEX: 20.66 KG/M2 | DIASTOLIC BLOOD PRESSURE: 80 MMHG | HEART RATE: 98 BPM | WEIGHT: 121 LBS | TEMPERATURE: 97.6 F | SYSTOLIC BLOOD PRESSURE: 127 MMHG

## 2021-02-19 DIAGNOSIS — R19.8 OTHER SPECIFIED SYMPTOMS AND SIGNS INVOLVING THE DIGESTIVE SYSTEM AND ABDOMEN: ICD-10-CM

## 2021-02-19 DIAGNOSIS — R93.5 ABNORMAL FINDINGS ON DIAGNOSTIC IMAGING OF OTHER ABDOMINAL REGIONS, INCLUDING RETROPERITONEUM: ICD-10-CM

## 2021-02-19 DIAGNOSIS — R19.7 DIARRHEA, UNSPECIFIED: ICD-10-CM

## 2021-02-19 DIAGNOSIS — R89.4 ABNORMAL IMMUNOLOGICAL FINDINGS IN SPECIMENS FROM OTHER ORGANS, SYSTEMS AND TISSUES: ICD-10-CM

## 2021-02-19 DIAGNOSIS — Z87.19 PERSONAL HISTORY OF OTHER DISEASES OF THE DIGESTIVE SYSTEM: ICD-10-CM

## 2021-02-19 DIAGNOSIS — R10.9 UNSPECIFIED ABDOMINAL PAIN: ICD-10-CM

## 2021-02-19 PROCEDURE — 99072 ADDL SUPL MATRL&STAF TM PHE: CPT

## 2021-02-19 PROCEDURE — 99214 OFFICE O/P EST MOD 30 MIN: CPT

## 2021-02-19 NOTE — HISTORY OF PRESENT ILLNESS
[FreeTextEntry1] : Sara has been averaging 1 well-formed BM daily, maintained on Apriso 4 caps each AM.  She denies recent abdominal pain or rectal bleeding.  Labs revealed weakly positive transglutaminase antibodies, and equivocal ASCA-IgG, indeterminate atypical ANCA. CT enterography 2/4/2021 revealed short segment hyperenhancing distal ileum and ileocecal valve, trace fluid in the pelvis, with normal colon.  Copies of pertinent documents were given.

## 2021-02-19 NOTE — PHYSICAL EXAM
[General Appearance - Alert] : alert [General Appearance - In No Acute Distress] : in no acute distress [General Appearance - Well Nourished] : well nourished [General Appearance - Well Developed] : well developed [Sclera] : the sclera and conjunctiva were normal [Neck Appearance] : the appearance of the neck was normal [Neck Cervical Mass (___cm)] : no neck mass was observed [Jugular Venous Distention Increased] : there was no jugular-venous distention [Thyroid Diffuse Enlargement] : the thyroid was not enlarged [Thyroid Nodule] : there were no palpable thyroid nodules [Auscultation Breath Sounds / Voice Sounds] : lungs were clear to auscultation bilaterally [Heart Rate And Rhythm] : heart rate was normal and rhythm regular [Heart Sounds] : normal S1 and S2 [Heart Sounds Gallop] : no gallops [Murmurs] : no murmurs [Heart Sounds Pericardial Friction Rub] : no pericardial rub [Full Pulse] : the pedal pulses are present [Edema] : there was no peripheral edema [Bowel Sounds] : normal bowel sounds [Abdomen Soft] : soft [Abdomen Tenderness] : non-tender [Abdomen Mass (___ Cm)] : no abdominal mass palpated [Abdomen Hernia] : no hernia was discovered [Skin Color & Pigmentation] : normal skin color and pigmentation [Skin Turgor] : normal skin turgor [] : no rash [Oriented To Time, Place, And Person] : oriented to person, place, and time [Impaired Insight] : insight and judgment were intact [Affect] : the affect was normal [FreeTextEntry1] : shannontoos

## 2021-02-19 NOTE — REASON FOR VISIT
[Follow-Up: _____] : a [unfilled] follow-up visit [Other: _____] : [unfilled] [FreeTextEntry1] : Routine follow up, history of colitis

## 2021-02-19 NOTE — ASSESSMENT
[FreeTextEntry1] : 1.  Pancolitis (with microscopic ileal involvement as well) at colonoscopy 1/13/2021, back to baseline on Apriso.  Equivocal  ASCA-IgG and abnormal CT (distal ileal thickening) suggests Crohn's disease.\par 2.  Weakly positive transglutaminase antibodies suggest the possibility of celiac disease.\par 3.  Allergic to amoxicillin, peanuts.\par \par Plan:\par 1.  Upper endoscopy with small bowel biopsies, on a gluten-containing diet, advised to assess for possible concomitant celiac disease. Procedure, rationale, alternatives, material risks, and anesthesia plan reviewed and brochure given.\par 2.  Probable capsule endoscopy of the small intestine to follow, to further elucidate extent of IBD, if even present.\par 3.  Continue Apriso for now; may be able to decrease to 2 caps daily after above studies performed, and perhaps even stop at some point.  However, she is aware that her issues may have been the initial presentation of IBD, and that she may require lifelong medicines for same.\par 4.  Get an internist!\par 5.  I also mentioned that if she is hesitant to proceed with the above recommendations, she should consider second GI opinion elsewhere.

## 2021-03-11 DIAGNOSIS — Z01.818 ENCOUNTER FOR OTHER PREPROCEDURAL EXAMINATION: ICD-10-CM

## 2021-03-12 ENCOUNTER — APPOINTMENT (OUTPATIENT)
Dept: DISASTER EMERGENCY | Facility: CLINIC | Age: 21
End: 2021-03-12

## 2021-03-13 LAB — SARS-COV-2 N GENE NPH QL NAA+PROBE: NOT DETECTED

## 2021-03-17 ENCOUNTER — LABORATORY RESULT (OUTPATIENT)
Age: 21
End: 2021-03-17

## 2021-03-17 ENCOUNTER — APPOINTMENT (OUTPATIENT)
Dept: GASTROENTEROLOGY | Facility: CLINIC | Age: 21
End: 2021-03-17
Payer: COMMERCIAL

## 2021-03-17 PROCEDURE — 43239 EGD BIOPSY SINGLE/MULTIPLE: CPT

## 2021-03-17 PROCEDURE — 84703 CHORIONIC GONADOTROPIN ASSAY: CPT | Mod: 59,QW

## 2021-03-17 PROCEDURE — 99072 ADDL SUPL MATRL&STAF TM PHE: CPT

## 2021-04-07 ENCOUNTER — APPOINTMENT (OUTPATIENT)
Dept: GASTROENTEROLOGY | Facility: CLINIC | Age: 21
End: 2021-04-07
Payer: COMMERCIAL

## 2021-04-07 VITALS
SYSTOLIC BLOOD PRESSURE: 128 MMHG | BODY MASS INDEX: 21.97 KG/M2 | HEIGHT: 63 IN | TEMPERATURE: 97.1 F | DIASTOLIC BLOOD PRESSURE: 94 MMHG | WEIGHT: 124 LBS

## 2021-04-07 DIAGNOSIS — B96.81 GASTRITIS, UNSPECIFIED, W/OUT BLEEDING: ICD-10-CM

## 2021-04-07 DIAGNOSIS — K29.70 GASTRITIS, UNSPECIFIED, W/OUT BLEEDING: ICD-10-CM

## 2021-04-07 DIAGNOSIS — K52.9 NONINFECTIVE GASTROENTERITIS AND COLITIS, UNSPECIFIED: ICD-10-CM

## 2021-04-07 DIAGNOSIS — K62.5 HEMORRHAGE OF ANUS AND RECTUM: ICD-10-CM

## 2021-04-07 PROCEDURE — 99214 OFFICE O/P EST MOD 30 MIN: CPT

## 2021-04-07 PROCEDURE — 99072 ADDL SUPL MATRL&STAF TM PHE: CPT

## 2021-05-25 NOTE — ED ADULT TRIAGE NOTE - PAIN: PRESENCE, MLM
Trial of valtrex to take for 3 days at onset of mouth sore to see if stops recurring complains of pain/discomfort

## 2023-01-19 ENCOUNTER — APPOINTMENT (OUTPATIENT)
Dept: OTOLARYNGOLOGY | Facility: CLINIC | Age: 23
End: 2023-01-19
Payer: COMMERCIAL

## 2023-01-19 VITALS
SYSTOLIC BLOOD PRESSURE: 161 MMHG | HEIGHT: 63 IN | DIASTOLIC BLOOD PRESSURE: 88 MMHG | WEIGHT: 124 LBS | BODY MASS INDEX: 21.97 KG/M2 | HEART RATE: 77 BPM

## 2023-01-19 DIAGNOSIS — R59.0 LOCALIZED ENLARGED LYMPH NODES: ICD-10-CM

## 2023-01-19 DIAGNOSIS — Z87.09 PERSONAL HISTORY OF OTHER DISEASES OF THE RESPIRATORY SYSTEM: ICD-10-CM

## 2023-01-19 PROCEDURE — 99204 OFFICE O/P NEW MOD 45 MIN: CPT

## 2023-01-19 RX ORDER — METRONIDAZOLE 500 MG/1
500 TABLET ORAL TWICE DAILY
Qty: 20 | Refills: 0 | Status: COMPLETED | COMMUNITY
Start: 2021-04-07 | End: 2023-01-19

## 2023-01-19 RX ORDER — MESALAMINE 0.38 G/1
0.38 CAPSULE, EXTENDED RELEASE ORAL
Qty: 360 | Refills: 0 | Status: COMPLETED | COMMUNITY
End: 2023-01-19

## 2023-01-19 RX ORDER — OMEPRAZOLE 20 MG/1
20 CAPSULE, DELAYED RELEASE ORAL
Qty: 20 | Refills: 0 | Status: COMPLETED | COMMUNITY
Start: 2021-04-07 | End: 2023-01-19

## 2023-01-19 RX ORDER — CLARITHROMYCIN 500 MG/1
500 TABLET, FILM COATED ORAL TWICE DAILY
Qty: 20 | Refills: 0 | Status: COMPLETED | COMMUNITY
Start: 2021-04-07 | End: 2023-01-19

## 2023-01-19 NOTE — CONSULT LETTER
[Consult Letter:] : I had the pleasure of evaluating your patient, [unfilled]. [Please see my note below.] : Please see my note below. [Consult Closing:] : Thank you very much for allowing me to participate in the care of this patient.  If you have any questions, please do not hesitate to contact me. [Sincerely,] : Sincerely, [FreeTextEntry2] : Dear Constance Espinal [FreeTextEntry3] : \par Scooter Morales MD, FACS\par \par Otolaryngology-Head and Neck Surgery\par [employer] [facility]\par

## 2023-01-19 NOTE — HISTORY OF PRESENT ILLNESS
[de-identified] : 22 year old female referred by YOSEPH Espinal, presents with Right lymph node nodule first noticed 3 months ago \par States lesion has not grown in size- firm to touch and does not move around. \par US showed Palpable mass in the midline of the neck corresponds to several nonenlarged lymph nodes with normal morphology on ultrasound. 2.6 cm right level 2 lymph node with a thickened cortex of 1.7 cm . Additional bilateral nonenlarged cervical lymph nodes are seen with normal morphology on ultrasound. \par Reports last undiagnosed sinus infection was 10/2022- with cough, nasal congestion, sore throat and otalgia- now symptoms have subsided.  \par Patient currently smokes marijuana daily \par No biopsy done. \par Patient denies dysphagia, odynophagia, dyspnea, dysphonia or otalgia, neck swelling, drainage or redness, headaches, fevers and coughing up blood. \par Denies use of over the counter antacids or reflux medications.\par Denies hx of Throat infections and thyroid disease.  \par \par US Soft Tissue Neck 01/12/23 ([facility]):\par \par FINDINGS: In the location of the palpable mass in the midline of the neck there are several lymph nodes measuring up to 0.9 cm in maximal dimension. They all demonstrate fatty echogenic joana and normal thin cortices.\par \par Cervical lymph nodes are seen as follows:\par On the right:\par Level One: A 0.8 x 0.6 x 1.4 cm lymph node with a fatty echogenic hilum and normal thin cortex, a 0.9 x 0.5 x 0.9 cM lymph node with a fatty echogenic hilum and normal thin cortex, \par level 2: A 2.6 x 0.6 x 2.4 cm lymph node with a thickened cortex of 1.7 cm.\par Level 5: A 1.1 x 0.3 x 0.6 cm lymph node with a fatty echogenic hilum and normal thin cortex, a 1.4 x 0.3 x 1.0 cm lymph node with a fatty echogenic hilum and normal thin cortex\par On the left:\par Level One: A 0.6 x 0.6 x 0.5 cm lymph node with a thin cortex\par Level 2: A 1.0 x 0.5 x 0.6 cm lymph node with a fatty echogenic hilum and normal thin cortex, a 1.2 x 0.9 x 1.2 cm lymph node with a fatty echogenic hilum and normal thin cortex\par Level 3: A 1.9 x 0.3 x 0.9 cM lymph node with a fatty echogenic hilum and normal thin cortex\par \par IMPRESSION:\par 1. Palpable mass in the midline of the neck corresponds to several nonenlarged lymph nodes with normal morphology on ultrasound.\par 2. 2.6 cm right level 2 lymph node with a thickened cortex of 1.7 cm. Further evaluation such as with biopsy is advised as clinically warranted.\par 3. Additional bilateral nonenlarged cervical lymph nodes are seen with normal morphology on ultrasound.\par \par

## 2023-02-09 ENCOUNTER — RESULT REVIEW (OUTPATIENT)
Age: 23
End: 2023-02-09

## 2023-02-09 ENCOUNTER — OUTPATIENT (OUTPATIENT)
Dept: OUTPATIENT SERVICES | Facility: HOSPITAL | Age: 23
LOS: 1 days | End: 2023-02-09
Payer: COMMERCIAL

## 2023-02-09 ENCOUNTER — APPOINTMENT (OUTPATIENT)
Dept: ULTRASOUND IMAGING | Facility: IMAGING CENTER | Age: 23
End: 2023-02-09
Payer: MEDICAID

## 2023-02-09 DIAGNOSIS — R59.0 LOCALIZED ENLARGED LYMPH NODES: ICD-10-CM

## 2023-02-09 DIAGNOSIS — Z00.8 ENCOUNTER FOR OTHER GENERAL EXAMINATION: ICD-10-CM

## 2023-02-09 PROCEDURE — 88173 CYTOPATH EVAL FNA REPORT: CPT | Mod: 26

## 2023-02-09 PROCEDURE — 87205 SMEAR GRAM STAIN: CPT

## 2023-02-09 PROCEDURE — 88305 TISSUE EXAM BY PATHOLOGIST: CPT

## 2023-02-09 PROCEDURE — 10005 FNA BX W/US GDN 1ST LES: CPT

## 2023-02-09 PROCEDURE — 88172 CYTP DX EVAL FNA 1ST EA SITE: CPT

## 2023-02-09 PROCEDURE — 88305 TISSUE EXAM BY PATHOLOGIST: CPT | Mod: 26

## 2023-02-09 PROCEDURE — 88189 FLOWCYTOMETRY/READ 16 & >: CPT

## 2023-02-09 PROCEDURE — 88185 FLOWCYTOMETRY/TC ADD-ON: CPT

## 2023-02-09 PROCEDURE — 88173 CYTOPATH EVAL FNA REPORT: CPT

## 2023-02-10 LAB — TM INTERPRETATION: SIGNIFICANT CHANGE UP

## 2023-03-31 ENCOUNTER — NON-APPOINTMENT (OUTPATIENT)
Age: 23
End: 2023-03-31

## 2023-04-03 NOTE — ASSESSMENT
[FreeTextEntry1] : 1.  Pancolitis (with microscopic ileal involvement, as well) at colonoscopy 1/13/2021, with occasional rectal bleeding on Apriso.  Equivocal  ASCA-IgG and abnormal CT (distal ileal thickening) suggests Crohn's disease.  Degree of small bowel involvement with Crohn's unclear at this time.\par 2.  Helicobacter pylori gastritis at EGD March 2021; mild increase in duodenal intraepithelial lymphocytes likely from Helicobacter rather than celiac disease (despite weak + transglutaminase antibodies).\par 3.  Allergic to amoxicillin, peanuts.\par \par Plan:\par 1.  Diagnosis, implications, prognosis, therapy of Helicobacter discussed at length.\par 2.  Triple therapy twice daily with food for 10 days to include: Omeprazole 20 mg + Clarithromycin 500 mg + Metronidazole 500 mg (note allergy to amoxicillin).\par 3.  At least two weeks after triple therapy has been completed, submit stool to lab for Helicobacter pylori antigen.\par 4.  Schedule capsule endoscopy of the small intestine--Procedure, rationale, alternatives, material risks, and prep instructions were reviewed and brochure given.\par 5.  Continue Apriso 4 caps daily for now.\par 6.  Repeat colonoscopy in early 2022.\par

## 2023-04-03 NOTE — HISTORY OF PRESENT ILLNESS
[FreeTextEntry1] : EGD 3/17/2021 revealed Helicobacter pylori gastritis and mild increase in duodenal intraepithelial lymphocytes (commonly seen with Helicobacter).  She has noted occasional blood in the stool, taking Apriso daily.  Biopsy report was given to her.

## 2023-04-03 NOTE — REASON FOR VISIT
[Follow-Up: _____] : a [unfilled] follow-up visit [FreeTextEntry1] : S/P endoscopy.  Consultation for positive H. pylori .

## 2023-04-03 NOTE — REVIEW OF SYSTEMS
[Negative] : Heme/Lymph [As Noted in HPI] : as noted in HPI [FreeTextEntry7] : Noticed blood in stool.

## 2023-04-17 NOTE — ED PROVIDER NOTE - NS ED ATTENDING STATEMENT MOD
I have personally seen and examined this patient.  I have fully participated in the care of this patient. I have reviewed all pertinent clinical information, including history, physical exam, plan and the Resident’s note and agree except as noted. Negative

## 2023-04-26 ENCOUNTER — APPOINTMENT (OUTPATIENT)
Dept: ULTRASOUND IMAGING | Facility: CLINIC | Age: 23
End: 2023-04-26

## 2023-04-29 ENCOUNTER — APPOINTMENT (OUTPATIENT)
Dept: ULTRASOUND IMAGING | Facility: CLINIC | Age: 23
End: 2023-04-29

## 2023-05-05 ENCOUNTER — APPOINTMENT (OUTPATIENT)
Dept: ULTRASOUND IMAGING | Facility: CLINIC | Age: 23
End: 2023-05-05

## 2023-05-05 ENCOUNTER — OUTPATIENT (OUTPATIENT)
Dept: OUTPATIENT SERVICES | Facility: HOSPITAL | Age: 23
LOS: 1 days | End: 2023-05-05

## 2023-05-13 ENCOUNTER — APPOINTMENT (OUTPATIENT)
Dept: ULTRASOUND IMAGING | Facility: CLINIC | Age: 23
End: 2023-05-13

## 2023-05-16 ENCOUNTER — OUTPATIENT (OUTPATIENT)
Dept: OUTPATIENT SERVICES | Facility: HOSPITAL | Age: 23
LOS: 1 days | End: 2023-05-16

## 2023-05-18 ENCOUNTER — APPOINTMENT (OUTPATIENT)
Dept: ULTRASOUND IMAGING | Facility: CLINIC | Age: 23
End: 2023-05-18
Payer: MEDICAID

## 2023-05-18 ENCOUNTER — RESULT REVIEW (OUTPATIENT)
Age: 23
End: 2023-05-18

## 2023-05-18 ENCOUNTER — OUTPATIENT (OUTPATIENT)
Dept: OUTPATIENT SERVICES | Facility: HOSPITAL | Age: 23
LOS: 1 days | End: 2023-05-18

## 2023-05-18 PROCEDURE — 76536 US EXAM OF HEAD AND NECK: CPT | Mod: 26

## 2023-05-22 ENCOUNTER — NON-APPOINTMENT (OUTPATIENT)
Age: 23
End: 2023-05-22

## 2024-07-03 NOTE — ED ADULT NURSE NOTE - NSFALLRSKUNASSIST_ED_ALL_ED
-- DO NOT REPLY / DO NOT REPLY ALL --  -- This inbox is not monitored  -- Message is from Engagement Center Operations ([facility]) --      Message Type:  Refill Medication   Refill request for Pended medication named: losartan (COZAAR) 50 MG tablet   Preferred pharmacy verified, and selected.   [facility] - [place], [place] - [address]    Is the patient OUT of Medication?  Yes and Medication Refills handled by Practice Site        Message:                    no